# Patient Record
Sex: MALE | Race: WHITE | NOT HISPANIC OR LATINO | Employment: OTHER | URBAN - METROPOLITAN AREA
[De-identification: names, ages, dates, MRNs, and addresses within clinical notes are randomized per-mention and may not be internally consistent; named-entity substitution may affect disease eponyms.]

---

## 2023-11-22 ENCOUNTER — APPOINTMENT (OUTPATIENT)
Dept: CT IMAGING | Facility: CLINIC | Age: 63
End: 2023-11-22
Attending: PHYSICIAN ASSISTANT
Payer: COMMERCIAL

## 2023-11-22 ENCOUNTER — HOSPITAL ENCOUNTER (EMERGENCY)
Facility: CLINIC | Age: 63
Discharge: HOME | End: 2023-11-22
Attending: PHYSICIAN ASSISTANT | Admitting: PHYSICIAN ASSISTANT
Payer: COMMERCIAL

## 2023-11-22 VITALS
SYSTOLIC BLOOD PRESSURE: 170 MMHG | RESPIRATION RATE: 20 BRPM | WEIGHT: 250.6 LBS | TEMPERATURE: 97.8 F | OXYGEN SATURATION: 96 % | HEART RATE: 78 BPM | HEIGHT: 70 IN | DIASTOLIC BLOOD PRESSURE: 85 MMHG | BODY MASS INDEX: 35.88 KG/M2

## 2023-11-22 DIAGNOSIS — K42.9 UMBILICAL HERNIA WITHOUT OBSTRUCTION AND WITHOUT GANGRENE: ICD-10-CM

## 2023-11-22 LAB
ALBUMIN SERPL BCG-MCNC: 4.4 G/DL (ref 3.5–5.2)
ALP SERPL-CCNC: 103 U/L (ref 40–150)
ALT SERPL W P-5'-P-CCNC: 42 U/L (ref 0–70)
ANION GAP SERPL CALCULATED.3IONS-SCNC: 16 MMOL/L (ref 7–15)
AST SERPL W P-5'-P-CCNC: 23 U/L (ref 0–45)
BASOPHILS # BLD AUTO: 0.1 10E3/UL (ref 0–0.2)
BASOPHILS NFR BLD AUTO: 1 %
BILIRUB SERPL-MCNC: 0.4 MG/DL
BUN SERPL-MCNC: 17.3 MG/DL (ref 8–23)
CALCIUM SERPL-MCNC: 9.2 MG/DL (ref 8.8–10.2)
CHLORIDE SERPL-SCNC: 100 MMOL/L (ref 98–107)
CREAT SERPL-MCNC: 1.24 MG/DL (ref 0.67–1.17)
CRP SERPL-MCNC: 5.51 MG/L
DEPRECATED HCO3 PLAS-SCNC: 21 MMOL/L (ref 22–29)
EGFRCR SERPLBLD CKD-EPI 2021: 66 ML/MIN/1.73M2
EOSINOPHIL # BLD AUTO: 0.2 10E3/UL (ref 0–0.7)
EOSINOPHIL NFR BLD AUTO: 3 %
ERYTHROCYTE [DISTWIDTH] IN BLOOD BY AUTOMATED COUNT: 12.5 % (ref 10–15)
GLUCOSE SERPL-MCNC: 145 MG/DL (ref 70–99)
HCT VFR BLD AUTO: 40.9 % (ref 40–53)
HGB BLD-MCNC: 13.5 G/DL (ref 13.3–17.7)
IMM GRANULOCYTES # BLD: 0 10E3/UL
IMM GRANULOCYTES NFR BLD: 0 %
LACTATE SERPL-SCNC: 1.8 MMOL/L (ref 0.7–2)
LYMPHOCYTES # BLD AUTO: 1.8 10E3/UL (ref 0.8–5.3)
LYMPHOCYTES NFR BLD AUTO: 27 %
MCH RBC QN AUTO: 29.7 PG (ref 26.5–33)
MCHC RBC AUTO-ENTMCNC: 33 G/DL (ref 31.5–36.5)
MCV RBC AUTO: 90 FL (ref 78–100)
MONOCYTES # BLD AUTO: 0.7 10E3/UL (ref 0–1.3)
MONOCYTES NFR BLD AUTO: 10 %
NEUTROPHILS # BLD AUTO: 4.1 10E3/UL (ref 1.6–8.3)
NEUTROPHILS NFR BLD AUTO: 59 %
NRBC # BLD AUTO: 0 10E3/UL
NRBC BLD AUTO-RTO: 0 /100
PLATELET # BLD AUTO: 225 10E3/UL (ref 150–450)
POTASSIUM SERPL-SCNC: 3.8 MMOL/L (ref 3.4–5.3)
PROT SERPL-MCNC: 7.1 G/DL (ref 6.4–8.3)
RBC # BLD AUTO: 4.54 10E6/UL (ref 4.4–5.9)
SODIUM SERPL-SCNC: 137 MMOL/L (ref 135–145)
WBC # BLD AUTO: 6.8 10E3/UL (ref 4–11)

## 2023-11-22 PROCEDURE — 80053 COMPREHEN METABOLIC PANEL: CPT | Performed by: PHYSICIAN ASSISTANT

## 2023-11-22 PROCEDURE — 250N000011 HC RX IP 250 OP 636: Performed by: PHYSICIAN ASSISTANT

## 2023-11-22 PROCEDURE — 36415 COLL VENOUS BLD VENIPUNCTURE: CPT | Performed by: PHYSICIAN ASSISTANT

## 2023-11-22 PROCEDURE — 86140 C-REACTIVE PROTEIN: CPT | Performed by: PHYSICIAN ASSISTANT

## 2023-11-22 PROCEDURE — 99284 EMERGENCY DEPT VISIT MOD MDM: CPT | Performed by: PHYSICIAN ASSISTANT

## 2023-11-22 PROCEDURE — 85025 COMPLETE CBC W/AUTO DIFF WBC: CPT | Performed by: PHYSICIAN ASSISTANT

## 2023-11-22 PROCEDURE — 74177 CT ABD & PELVIS W/CONTRAST: CPT

## 2023-11-22 PROCEDURE — 99285 EMERGENCY DEPT VISIT HI MDM: CPT | Mod: 25 | Performed by: PHYSICIAN ASSISTANT

## 2023-11-22 PROCEDURE — 250N000009 HC RX 250: Performed by: PHYSICIAN ASSISTANT

## 2023-11-22 PROCEDURE — 83605 ASSAY OF LACTIC ACID: CPT | Performed by: PHYSICIAN ASSISTANT

## 2023-11-22 RX ORDER — IOPAMIDOL 755 MG/ML
500 INJECTION, SOLUTION INTRAVASCULAR ONCE
Status: COMPLETED | OUTPATIENT
Start: 2023-11-22 | End: 2023-11-22

## 2023-11-22 RX ADMIN — IOPAMIDOL 100 ML: 755 INJECTION, SOLUTION INTRAVENOUS at 20:02

## 2023-11-22 RX ADMIN — SODIUM CHLORIDE 60 ML: 9 INJECTION, SOLUTION INTRAVENOUS at 20:02

## 2023-11-22 ASSESSMENT — ACTIVITIES OF DAILY LIVING (ADL)
ADLS_ACUITY_SCORE: 33
ADLS_ACUITY_SCORE: 35

## 2023-11-23 NOTE — DISCHARGE INSTRUCTIONS
It was a pleasure working with you today!  I hope your condition improves rapidly!     Please rest is much as possible the next couple days.  Do not lift anything over 5 pounds.  Use ice over the hernia area for 20 minutes every couple hours if it is uncomfortable.  If you have increasing pain or vomiting, do not hesitate to return to the emergency department for recheck.  Otherwise, plan on seeing the general surgeon for the consultation that was set up for you.  They will discuss the plan and talk about setting up surgery at that consultation appointment.

## 2023-11-23 NOTE — ED PROVIDER NOTES
"  History     Chief Complaint   Patient presents with    Wound Check     HPI  Alfonso Wolfe is a 63 year old male who presents for evaluation of umbilical discomfort starting earlier today.  Was seen at urgent care but sent to the ED.  Denies any fevers or chills.  No nausea or vomiting.  Normal bowel movement this morning which she generally has on a daily basis.  No hematochezia or melena.  Denies any dysuria, frequency, urgency, flank pain, or gross hematuria.  Has never had this issue before.  Has not taken any pain medication.  Rates his pain 4 on a scale of 10.  More of a dull aching pain.    Allergies:  No Known Allergies    Problem List:    There are no problems to display for this patient.       Past Medical History:    History reviewed. No pertinent past medical history.    Past Surgical History:    History reviewed. No pertinent surgical history.    Family History:    History reviewed. No pertinent family history.    Social History:  Marital Status:   [2]  Social History     Tobacco Use    Smoking status: Never    Smokeless tobacco: Never   Substance Use Topics    Alcohol use: Yes     Comment: occasional    Drug use: Never        Medications:    No current outpatient medications on file.        Review of Systems   All other systems reviewed and are negative.      Physical Exam   BP: (!) 170/85  Pulse: 78  Temp: 97.8  F (36.6  C)  Resp: 20  Height: 177.8 cm (5' 10\")  Weight: 113.7 kg (250 lb 9.6 oz)  SpO2: 96 %      Physical Exam  Vitals and nursing note reviewed.   Constitutional:       General: He is not in acute distress.     Appearance: He is not diaphoretic.   HENT:      Head: Normocephalic and atraumatic.      Right Ear: External ear normal.      Left Ear: External ear normal.      Nose: Nose normal.      Mouth/Throat:      Pharynx: No oropharyngeal exudate.   Eyes:      General: No scleral icterus.        Right eye: No discharge.         Left eye: No discharge.      Conjunctiva/sclera: " Conjunctivae normal.      Pupils: Pupils are equal, round, and reactive to light.   Neck:      Thyroid: No thyromegaly.   Cardiovascular:      Rate and Rhythm: Normal rate and regular rhythm.      Heart sounds: Normal heart sounds. No murmur heard.  Pulmonary:      Effort: Pulmonary effort is normal. No respiratory distress.      Breath sounds: Normal breath sounds. No wheezing or rales.   Chest:      Chest wall: No tenderness.   Abdominal:      General: Bowel sounds are normal. There is no distension.      Palpations: Abdomen is soft. There is no mass.      Tenderness: There is no abdominal tenderness. There is no guarding or rebound.      Hernia: A hernia (Grape sized umbilical hernia which is very tender to palpation.  There is about 5 cm of surrounding pink discoloration versus erythema of the skin around it.  No streaking.  No fluctuance or induration.) is present.   Musculoskeletal:         General: No tenderness or deformity. Normal range of motion.      Cervical back: Normal range of motion and neck supple.   Lymphadenopathy:      Cervical: No cervical adenopathy.   Skin:     General: Skin is warm and dry.      Capillary Refill: Capillary refill takes less than 2 seconds.      Findings: No erythema or rash.   Neurological:      Mental Status: He is alert and oriented to person, place, and time.      Cranial Nerves: No cranial nerve deficit.   Psychiatric:         Behavior: Behavior normal.         Thought Content: Thought content normal.         ED Course                 Procedures              Critical Care time:  none               Results for orders placed or performed during the hospital encounter of 11/22/23 (from the past 24 hour(s))   CBC with platelets differential    Narrative    The following orders were created for panel order CBC with platelets differential.  Procedure                               Abnormality         Status                     ---------                               -----------          ------                     CBC with platelets and d...[[id number]]                      Final result                 Please view results for these tests on the individual orders.   Comprehensive metabolic panel   Result Value Ref Range    Sodium 137 135 - 145 mmol/L    Potassium 3.8 3.4 - 5.3 mmol/L    Carbon Dioxide (CO2) 21 (L) 22 - 29 mmol/L    Anion Gap 16 (H) 7 - 15 mmol/L    Urea Nitrogen 17.3 8.0 - 23.0 mg/dL    Creatinine 1.24 (H) 0.67 - 1.17 mg/dL    GFR Estimate 66 >60 mL/min/1.73m2    Calcium 9.2 8.8 - 10.2 mg/dL    Chloride 100 98 - 107 mmol/L    Glucose 145 (H) 70 - 99 mg/dL    Alkaline Phosphatase 103 40 - 150 U/L    AST 23 0 - 45 U/L    ALT 42 0 - 70 U/L    Protein Total 7.1 6.4 - 8.3 g/dL    Albumin 4.4 3.5 - 5.2 g/dL    Bilirubin Total 0.4 <=1.2 mg/dL   CRP inflammation   Result Value Ref Range    CRP Inflammation 5.51 (H) <5.00 mg/L   Lactic acid whole blood   Result Value Ref Range    Lactic Acid 1.8 0.7 - 2.0 mmol/L   CBC with platelets and differential   Result Value Ref Range    WBC Count 6.8 4.0 - 11.0 10e3/uL    RBC Count 4.54 4.40 - 5.90 10e6/uL    Hemoglobin 13.5 13.3 - 17.7 g/dL    Hematocrit 40.9 40.0 - 53.0 %    MCV 90 78 - 100 fL    MCH 29.7 26.5 - 33.0 pg    MCHC 33.0 31.5 - 36.5 g/dL    RDW 12.5 10.0 - 15.0 %    Platelet Count 225 150 - 450 10e3/uL    % Neutrophils 59 %    % Lymphocytes 27 %    % Monocytes 10 %    % Eosinophils 3 %    % Basophils 1 %    % Immature Granulocytes 0 %    NRBCs per 100 WBC 0 <1 /100    Absolute Neutrophils 4.1 1.6 - 8.3 10e3/uL    Absolute Lymphocytes 1.8 0.8 - 5.3 10e3/uL    Absolute Monocytes 0.7 0.0 - 1.3 10e3/uL    Absolute Eosinophils 0.2 0.0 - 0.7 10e3/uL    Absolute Basophils 0.1 0.0 - 0.2 10e3/uL    Absolute Immature Granulocytes 0.0 <=0.4 10e3/uL    Absolute NRBCs 0.0 10e3/uL   CT Abdomen Pelvis w Contrast    Narrative    EXAM: CT ABDOMEN PELVIS W CONTRAST  LOCATION: [facility]  DATE: 11/22/2023    INDICATION:  Abdominal pain, umbilical hernia and redness, concern for strangulation incarceration.  COMPARISON: None.  TECHNIQUE: CT scan of the abdomen and pelvis was performed following injection of IV contrast. Multiplanar reformats were obtained. Dose reduction techniques were used.  CONTRAST: Isovue 370, 100 mL.    FINDINGS:   LOWER CHEST: Normal.    HEPATOBILIARY: There is moderate fatty infiltration seen of the liver. The liver is otherwise normal. The gallbladder, bile ducts, and portal veins are normal.    PANCREAS: Normal.    SPLEEN: Normal.    ADRENAL GLANDS: Normal.    KIDNEYS/BLADDER: Benign cysts in both kidneys with no follow-up recommended. The urinary tract is otherwise normal.    BOWEL: The appendix is surgically absent. The bowel is otherwise normal.    LYMPH NODES: Normal.    VASCULATURE: Mild scattered calcification with no aneurysm.    PELVIC ORGANS: Normal.    MUSCULOSKELETAL: There is a small fatty umbilical hernia with moderate stranding seen of the adjacent fat in the umbilical/periumbilical region worrisome for underlying changes of inflammation, but no evidence for an abscess. There is no bowel seen   associated with the small fatty umbilical hernia. There is advanced degenerative changes seen in the spine most marked at the L5-S1 interspace with degenerative disc disease.      Impression    IMPRESSION:   1.  There is a small fatty umbilical hernia with moderate stranding seen of the adjacent fat in the umbilical/periumbilical region worrisome for underlying changes of inflammation, but no evidence for an abscess. There is no bowel seen associated with   the small fatty umbilical hernia.     2.  Moderate fatty infiltration of the liver.    3.  The appendix is surgically absent.    4.  Benign cyst both kidneys and no follow-up recommended.       Medications   iopamidol (ISOVUE-370) solution 500 mL (100 mLs Intravenous $Given 11/22/23 2002)   sodium chloride 0.9 % bag 100mL for CT scan flush use (60  mLs Intravenous $Given 11/22/23 2002)       Assessments & Plan (with Medical Decision Making)  Umbilical hernia without obstruction and without gangrene     63 year old male presents for evaluation of umbilical discomfort starting earlier today.  Normal bowel movements.  No vomiting.  No fevers or chills.  No further GI or  symptoms.  See HPI above for details.  On exam blood pressure 170/85, temperature 97.8, pulse 78, respiration 20, oxygen saturation 96% on room air.  Patient is in no acute distress.  He has an umbilical hernia that is very tender to palpation.  Some mild surrounding pink discoloration of the skin but no streaking.  No induration or fluctuance.  Abdomen is otherwise nontender.  No hepatosplenomegaly.  No rebound or guarding.    We placed the patient in supine position slightly in Trendelenburg and placed ice over the painful umbilical hernia area.  Workup with CT and laboratory levels.  CT confirmed fat-containing hernia but no bowel within the hernia.  There is surrounding inflammatory change but no sign of obstruction.  I performed an independent review of this image and agree with the findings.  I reviewed the findings with the patient in detail.  Laboratory levels were reassuring with a normal white blood cell count of 6800.  Hemoglobin stable at 13.5.  Lactic acid level normal at 1.8.  CRP only minor elevation of 5.51.  Comprehensive metabolic panel with levels that are not acutely elevated.  Borderline creatinine.  LFTs normal.  Nonfasting glucose of 145.  Electrolytes normal.  I offered the patient pain medication or sedation to attempt reduction of the hernia.  He really wanted to be able to drive home, and wanted me to try reduction without any medication.  He did quite well tolerating the reduction attempts.  I did feel 2 different times where a good portion of the hernia did reduce well.  The size of the hernia decreased by at least 50%.  He did report 45 minutes later that his pain  went down to about 2-3 on a scale of 10.  I did consult with general surgery on-call, Dr. Hollingsworth, given my inability to completely reduce the hernia.  He did not recommend emergent repair of this hernia unless the patient's pain was unbearable.  Especially since there is no sign of obstruction or bowel within the hernia.  I spoke with the patient about this, and he states that his symptoms are actually better.  We discussed outpatient consultation with general surgery and the likelihood of him needing operative repair of this hernia given the significant symptoms.  Strict ED return instructions reviewed with the patient especially if he gets any increasing pain, fever, chills, vomiting, or any symptoms of obstruction.  I did ask Dr. Hollingsworth if I should consider sedation to get better relaxation and trying to completely reduce the hernia.  He stated this was not necessary given that his symptoms were improved and since there was not any bowel within the hernia.     I have reviewed the nursing notes.    I have reviewed the findings, diagnosis, plan and need for follow up with the patient.           Medical Decision Making  The patient's presentation was of moderate complexity (an acute illness with systemic symptoms).    The patient's evaluation involved:  ordering and/or review of 3+ test(s) in this encounter (see separate area of note for details)    The patient's management necessitated moderate risk (a decision regarding minor procedure (hernia reduction) with risk factors of none).        There are no discharge medications for this patient.      Final diagnoses:   Umbilical hernia without obstruction and without gangrene     Disclaimer: This note consists of symbols derived from keyboarding, dictation and/or voice recognition software. As a result, there may be errors in the script that have gone undetected. Please consider this when interpreting information found in this chart.      11/22/2023   [facility]  [facility] EMERGENCY DEPT       Hugo Burnett PA-C  11/23/23 0036

## 2023-11-23 NOTE — ED TRIAGE NOTES
Pt reports he noticed his belly button was red and sore yesterday. Today his symptoms are worse, was seen in the UC today and sent to the ED.      Triage Assessment (Adult)       Row Name 11/22/23 1900          Triage Assessment    Airway WDL WDL        Respiratory WDL    Respiratory WDL WDL        Skin Circulation/Temperature WDL    Skin Circulation/Temperature WDL X